# Patient Record
Sex: FEMALE | Race: WHITE
[De-identification: names, ages, dates, MRNs, and addresses within clinical notes are randomized per-mention and may not be internally consistent; named-entity substitution may affect disease eponyms.]

---

## 2018-01-18 ENCOUNTER — HOSPITAL ENCOUNTER (OUTPATIENT)
Age: 78
End: 2018-01-18
Payer: COMMERCIAL

## 2018-01-18 DIAGNOSIS — M19.042: ICD-10-CM

## 2018-01-18 DIAGNOSIS — M05.741: Primary | ICD-10-CM

## 2018-01-18 DIAGNOSIS — M19.041: ICD-10-CM

## 2018-01-18 DIAGNOSIS — D89.9: ICD-10-CM

## 2018-01-18 DIAGNOSIS — R74.8: ICD-10-CM

## 2018-01-18 DIAGNOSIS — D72.818: ICD-10-CM

## 2018-01-18 DIAGNOSIS — M05.742: ICD-10-CM

## 2018-01-18 DIAGNOSIS — Z51.81: ICD-10-CM

## 2018-01-18 LAB
ALBUMIN LEVEL: 3.7 GM/DL (ref 3.4–5)
ALBUMIN/GLOB SERPL: 1.2 {RATIO} (ref 1.1–1.8)
ALP ISO SERPL-ACNC: 68 U/L (ref 46–116)
ALT SERPLBLD-CCNC: 106 U/L (ref 12–78)
ANION GAP SERPL CALC-SCNC: 9.3 MEQ/L (ref 5–15)
AST SERPL QL: 87 U/L (ref 15–37)
BILIRUBIN,TOTAL: 1.2 MG/DL (ref 0.2–1)
BUN SERPL-MCNC: 16 MG/DL (ref 7–18)
CALCIUM SPEC-MCNC: 8.8 MG/DL (ref 8.5–10.1)
CHLORIDE SPEC-SCNC: 103 MMOL/L (ref 98–107)
CO2 SERPL-SCNC: 28 MMOL/L (ref 21–32)
CREAT BLD-SCNC: 0.69 MG/DL (ref 0.55–1.02)
CRP SERPL HS-MCNC: 0.5 MG/L (ref 0–0.9)
ESTIMATED GLOMERULAR FILT RATE: 83 ML/MIN (ref 60–?)
GFR (AFRICAN AMERICAN): 100 ML/MIN (ref 60–?)
GLOBULIN SER CALC-MCNC: 3 GM/DL (ref 1.3–3.2)
GLUCOSE: 150 MG/DL (ref 74–106)
HCT VFR BLD CALC: 38.2 % (ref 37–47)
HGB BLD-MCNC: 12.1 G/DL (ref 12.2–16.2)
MCHC RBC-ENTMCNC: 31.7 G/DL (ref 31.8–35.4)
MCV RBC: 108.1 FL (ref 81–99)
MEAN CORPUSCULAR HEMOGLOBIN: 34.3 PG (ref 27–31.2)
PLATELET # BLD: 133 K/MM3 (ref 142–424)
POTASSIUM: 4.3 MMOL/L (ref 3.5–5.1)
PROT SERPL-MCNC: 6.7 GM/DL (ref 6.4–8.2)
RBC # BLD AUTO: 3.53 M/MM3 (ref 4.2–5.4)
SODIUM SPEC-SCNC: 136 MMOL/L (ref 136–145)
WBC # BLD AUTO: 4.1 K/MM3 (ref 4.8–10.8)

## 2018-01-18 PROCEDURE — 80053 COMPREHEN METABOLIC PANEL: CPT

## 2018-01-18 PROCEDURE — 36415 COLL VENOUS BLD VENIPUNCTURE: CPT

## 2018-01-18 PROCEDURE — 85651 RBC SED RATE NONAUTOMATED: CPT

## 2018-01-18 PROCEDURE — 86140 C-REACTIVE PROTEIN: CPT

## 2018-01-18 PROCEDURE — 85025 COMPLETE CBC W/AUTO DIFF WBC: CPT

## 2018-01-30 ENCOUNTER — OFFICE VISIT (OUTPATIENT)
Dept: RETAIL CLINIC | Facility: CLINIC | Age: 78
End: 2018-01-30

## 2018-01-30 VITALS
WEIGHT: 158 LBS | BODY MASS INDEX: 26.33 KG/M2 | RESPIRATION RATE: 18 BRPM | HEART RATE: 114 BPM | TEMPERATURE: 99.8 F | OXYGEN SATURATION: 96 % | HEIGHT: 65 IN

## 2018-01-30 DIAGNOSIS — J10.1 INFLUENZA B: Primary | ICD-10-CM

## 2018-01-30 LAB
EXPIRATION DATE: NORMAL
FLUAV AG NPH QL: NORMAL
FLUBV AG NPH QL: NORMAL
INTERNAL CONTROL: NORMAL
Lab: NORMAL

## 2018-01-30 PROCEDURE — 99213 OFFICE O/P EST LOW 20 MIN: CPT | Performed by: NURSE PRACTITIONER

## 2018-01-30 PROCEDURE — 87804 INFLUENZA ASSAY W/OPTIC: CPT | Performed by: NURSE PRACTITIONER

## 2018-01-30 RX ORDER — OSELTAMIVIR PHOSPHATE 75 MG/1
75 CAPSULE ORAL
Qty: 10 CAPSULE | Refills: 0 | Status: SHIPPED | OUTPATIENT
Start: 2018-01-30 | End: 2018-02-04

## 2018-01-30 RX ORDER — ALBUTEROL SULFATE 90 UG/1
2 AEROSOL, METERED RESPIRATORY (INHALATION) EVERY 4 HOURS PRN
Qty: 1 INHALER | Refills: 0 | Status: SHIPPED | OUTPATIENT
Start: 2018-01-30

## 2018-02-02 ENCOUNTER — HOSPITAL ENCOUNTER (INPATIENT)
Dept: HOSPITAL 22 - ER | Age: 78
LOS: 3 days | Discharge: HOME | DRG: 194 | End: 2018-02-05
Payer: COMMERCIAL

## 2018-02-02 VITALS
OXYGEN SATURATION: 99 % | RESPIRATION RATE: 18 BRPM | SYSTOLIC BLOOD PRESSURE: 135 MMHG | DIASTOLIC BLOOD PRESSURE: 78 MMHG | HEART RATE: 120 BPM | TEMPERATURE: 98.06 F

## 2018-02-02 VITALS — BODY MASS INDEX: 24.9 KG/M2 | BODY MASS INDEX: 26.2 KG/M2

## 2018-02-02 VITALS
HEART RATE: 107 BPM | RESPIRATION RATE: 18 BRPM | TEMPERATURE: 98.2 F | DIASTOLIC BLOOD PRESSURE: 82 MMHG | OXYGEN SATURATION: 98 % | SYSTOLIC BLOOD PRESSURE: 154 MMHG

## 2018-02-02 VITALS — HEART RATE: 97 BPM

## 2018-02-02 VITALS
OXYGEN SATURATION: 90 % | DIASTOLIC BLOOD PRESSURE: 79 MMHG | TEMPERATURE: 98 F | SYSTOLIC BLOOD PRESSURE: 150 MMHG | HEART RATE: 111 BPM | RESPIRATION RATE: 18 BRPM

## 2018-02-02 VITALS — HEART RATE: 80 BPM

## 2018-02-02 VITALS
RESPIRATION RATE: 18 BRPM | TEMPERATURE: 98.78 F | OXYGEN SATURATION: 97 % | DIASTOLIC BLOOD PRESSURE: 91 MMHG | SYSTOLIC BLOOD PRESSURE: 187 MMHG | HEART RATE: 108 BPM

## 2018-02-02 VITALS — OXYGEN SATURATION: 90 %

## 2018-02-02 DIAGNOSIS — I10: ICD-10-CM

## 2018-02-02 DIAGNOSIS — Z83.49: ICD-10-CM

## 2018-02-02 DIAGNOSIS — Z82.3: ICD-10-CM

## 2018-02-02 DIAGNOSIS — Z83.3: ICD-10-CM

## 2018-02-02 DIAGNOSIS — J18.9: Primary | ICD-10-CM

## 2018-02-02 DIAGNOSIS — N39.0: ICD-10-CM

## 2018-02-02 DIAGNOSIS — Z79.899: ICD-10-CM

## 2018-02-02 DIAGNOSIS — D61.818: ICD-10-CM

## 2018-02-02 DIAGNOSIS — Z82.49: ICD-10-CM

## 2018-02-02 DIAGNOSIS — Z79.52: ICD-10-CM

## 2018-02-02 LAB
ALBUMIN LEVEL: 3.4 GM/DL (ref 3.4–5)
ALBUMIN/GLOB SERPL: 1 {RATIO} (ref 1.1–1.8)
ALP ISO SERPL-ACNC: 61 U/L (ref 46–116)
ALT SERPLBLD-CCNC: 43 U/L (ref 12–78)
ANION GAP SERPL CALC-SCNC: 14.1 MEQ/L (ref 5–15)
AST SERPL QL: 41 U/L (ref 15–37)
BACTERIA URNS QL MICRO: (no result) /LPF
BILIRUBIN,TOTAL: 0.9 MG/DL (ref 0.2–1)
BUN SERPL-MCNC: 16 MG/DL (ref 7–18)
CALCIUM SPEC-MCNC: 8.3 MG/DL (ref 8.5–10.1)
CHLORIDE SPEC-SCNC: 99 MMOL/L (ref 98–107)
CO2 SERPL-SCNC: 26 MMOL/L (ref 21–32)
COLOR UR: YELLOW
CREAT BLD-SCNC: 0.66 MG/DL (ref 0.55–1.02)
CREATININE CLEARANCE ESTIMATED: 50 ML/MIN (ref 0–300)
ESTIMATED GLOMERULAR FILT RATE: 87 ML/MIN (ref 60–?)
GFR (AFRICAN AMERICAN): 106 ML/MIN (ref 60–?)
GLOBULIN SER CALC-MCNC: 3.5 GM/DL (ref 1.3–3.2)
GLUCOSE: 101 MG/DL (ref 74–106)
HCT VFR BLD CALC: 36.7 % (ref 37–47)
HGB BLD-MCNC: 11.8 G/DL (ref 12.2–16.2)
KETONES UR STRIP.AUTO-MCNC: (no result) MG/DL
LEUKOCYTE ESTERASE UR QL STRIP: (no result)
MCHC RBC-ENTMCNC: 32 G/DL (ref 31.8–35.4)
MCV RBC: 105.2 FL (ref 81–99)
MEAN CORPUSCULAR HEMOGLOBIN: 33.7 PG (ref 27–31.2)
MICRO URNS: (no result)
PH UR: 6 [PH] (ref 5–8.5)
PLATELET # BLD: 91 K/MM3 (ref 142–424)
POTASSIUM: 4.1 MMOL/L (ref 3.5–5.1)
PROT SERPL-MCNC: 6.9 GM/DL (ref 6.4–8.2)
RBC # BLD AUTO: 3.49 M/MM3 (ref 4.2–5.4)
RBC #/AREA URNS HPF: (no result) #/HPF (ref 0–3)
SODIUM SPEC-SCNC: 135 MMOL/L (ref 136–145)
SP GR UR: 1.01 (ref 1–1.03)
SQUAMOUS URNS QL MICRO: (no result) #/HPF (ref 0–5)
TRANS CELLS URNS QL MICRO: (no result) #/LPF (ref 0–3)
UROBILINOGEN UR QL: 0.2 EU/DL
WBC # BLD AUTO: 2.3 K/MM3 (ref 4.8–10.8)
WBC # UR: (no result) #/HPF (ref 0–3)

## 2018-02-02 PROCEDURE — 94640 AIRWAY INHALATION TREATMENT: CPT

## 2018-02-02 PROCEDURE — 83605 ASSAY OF LACTIC ACID: CPT

## 2018-02-02 PROCEDURE — 85025 COMPLETE CBC W/AUTO DIFF WBC: CPT

## 2018-02-02 PROCEDURE — 71046 X-RAY EXAM CHEST 2 VIEWS: CPT

## 2018-02-02 PROCEDURE — 87040 BLOOD CULTURE FOR BACTERIA: CPT

## 2018-02-02 PROCEDURE — 85007 BL SMEAR W/DIFF WBC COUNT: CPT

## 2018-02-02 PROCEDURE — 99281 EMR DPT VST MAYX REQ PHY/QHP: CPT

## 2018-02-02 PROCEDURE — 87088 URINE BACTERIA CULTURE: CPT

## 2018-02-02 PROCEDURE — 81001 URINALYSIS AUTO W/SCOPE: CPT

## 2018-02-02 PROCEDURE — 87086 URINE CULTURE/COLONY COUNT: CPT

## 2018-02-02 PROCEDURE — 80053 COMPREHEN METABOLIC PANEL: CPT

## 2018-02-02 PROCEDURE — 36415 COLL VENOUS BLD VENIPUNCTURE: CPT

## 2018-02-02 PROCEDURE — 87186 SC STD MICRODIL/AGAR DIL: CPT

## 2018-02-02 NOTE — PC.NURSE
daughter refused tessalon perrle for patient. pt educated on medication. md comes to room also to educate pt and family. Family still refuses med as well as the pt refuses med.

## 2018-02-02 NOTE — PC.NURSE
THIS PATIENT PRESENTED TO THE FLOOR AT APPROXIMATELY 1600 HOURS FROM THE ER WITH A DIAGNOSIS OF PNEUMONIA.  SHE WAS DIAGNOSED WITH THE FLU LAST THURSDAY AND THERE HAS BEEN NO IMPROVEMENT.  SHE STATES SHE TOOK HER TAMIFLU AS ORDERED.  ADMISSION HAS

## 2018-02-03 VITALS
HEART RATE: 97 BPM | SYSTOLIC BLOOD PRESSURE: 117 MMHG | DIASTOLIC BLOOD PRESSURE: 65 MMHG | RESPIRATION RATE: 18 BRPM | TEMPERATURE: 98.4 F | OXYGEN SATURATION: 94 %

## 2018-02-03 VITALS
TEMPERATURE: 98.24 F | SYSTOLIC BLOOD PRESSURE: 124 MMHG | OXYGEN SATURATION: 99 % | HEART RATE: 106 BPM | DIASTOLIC BLOOD PRESSURE: 67 MMHG | RESPIRATION RATE: 18 BRPM

## 2018-02-03 VITALS
TEMPERATURE: 98.1 F | RESPIRATION RATE: 18 BRPM | HEART RATE: 110 BPM | OXYGEN SATURATION: 98 % | SYSTOLIC BLOOD PRESSURE: 171 MMHG | DIASTOLIC BLOOD PRESSURE: 81 MMHG

## 2018-02-03 VITALS
TEMPERATURE: 98 F | HEART RATE: 72 BPM | DIASTOLIC BLOOD PRESSURE: 65 MMHG | RESPIRATION RATE: 16 BRPM | OXYGEN SATURATION: 94 % | SYSTOLIC BLOOD PRESSURE: 127 MMHG

## 2018-02-03 VITALS
DIASTOLIC BLOOD PRESSURE: 69 MMHG | RESPIRATION RATE: 18 BRPM | TEMPERATURE: 98.24 F | SYSTOLIC BLOOD PRESSURE: 126 MMHG | OXYGEN SATURATION: 95 % | HEART RATE: 95 BPM

## 2018-02-03 VITALS — OXYGEN SATURATION: 94 % | RESPIRATION RATE: 16 BRPM | HEART RATE: 81 BPM

## 2018-02-03 VITALS — OXYGEN SATURATION: 98 % | HEART RATE: 89 BPM

## 2018-02-03 VITALS
DIASTOLIC BLOOD PRESSURE: 72 MMHG | SYSTOLIC BLOOD PRESSURE: 134 MMHG | TEMPERATURE: 98.96 F | RESPIRATION RATE: 18 BRPM | OXYGEN SATURATION: 94 % | HEART RATE: 99 BPM

## 2018-02-03 VITALS — HEART RATE: 88 BPM

## 2018-02-03 VITALS — HEART RATE: 72 BPM

## 2018-02-03 LAB
CELLS COUNTED: 50
HCT VFR BLD CALC: 32.1 % (ref 37–47)
HGB BLD-MCNC: 10.5 G/DL (ref 12.2–16.2)
MACROCYTES BLD QL: (no result)
MANUAL DIFFERENTIAL: (no result)
MCHC RBC-ENTMCNC: 32.6 G/DL (ref 31.8–35.4)
MCV RBC: 105.2 FL (ref 81–99)
MEAN CORPUSCULAR HEMOGLOBIN: 34.3 PG (ref 27–31.2)
PLATELET # BLD: 71 K/MM3 (ref 142–424)
RBC # BLD AUTO: 3.05 M/MM3 (ref 4.2–5.4)
WBC # BLD AUTO: 1.3 K/MM3 (ref 4.8–10.8)

## 2018-02-03 NOTE — PC.NURSE
PT ALERT AND ORIENTED. SLEPT MOST OF SHIFT. DAUGHTER AT BEDSIDE. BREATH SOUNDS EQUAL AND CLEAR, RESPIRATIONS EVEN AND UNLABORED. CONTINUES ON IV NS@50/HR AND IV ABX. NO C/O PAIN OR DISCOMFORT. OCCASIONAL COUGH HEARD. PT STABLE. WILL CONTINUE TO

## 2018-02-03 NOTE — PC.NURSE
Rhonchi noted right anterior and posterior lobes. O2 sats in 90's on room air. Pt has been up to the chair several times this shift and has ambulated around her room with assist from family at bedside. No complaints verbalized. Will continue to

## 2018-02-04 VITALS
SYSTOLIC BLOOD PRESSURE: 155 MMHG | TEMPERATURE: 98.2 F | HEART RATE: 102 BPM | OXYGEN SATURATION: 98 % | DIASTOLIC BLOOD PRESSURE: 70 MMHG | RESPIRATION RATE: 18 BRPM

## 2018-02-04 VITALS
TEMPERATURE: 97.88 F | OXYGEN SATURATION: 96 % | SYSTOLIC BLOOD PRESSURE: 119 MMHG | HEART RATE: 86 BPM | DIASTOLIC BLOOD PRESSURE: 66 MMHG | RESPIRATION RATE: 16 BRPM

## 2018-02-04 VITALS
HEART RATE: 91 BPM | RESPIRATION RATE: 16 BRPM | SYSTOLIC BLOOD PRESSURE: 148 MMHG | OXYGEN SATURATION: 95 % | TEMPERATURE: 98.3 F | DIASTOLIC BLOOD PRESSURE: 75 MMHG

## 2018-02-04 VITALS
TEMPERATURE: 98.24 F | OXYGEN SATURATION: 95 % | RESPIRATION RATE: 18 BRPM | HEART RATE: 94 BPM | DIASTOLIC BLOOD PRESSURE: 63 MMHG | SYSTOLIC BLOOD PRESSURE: 137 MMHG

## 2018-02-04 VITALS — HEART RATE: 84 BPM

## 2018-02-04 VITALS
DIASTOLIC BLOOD PRESSURE: 56 MMHG | TEMPERATURE: 98.1 F | OXYGEN SATURATION: 95 % | HEART RATE: 93 BPM | RESPIRATION RATE: 18 BRPM | SYSTOLIC BLOOD PRESSURE: 127 MMHG

## 2018-02-04 VITALS — HEART RATE: 72 BPM

## 2018-02-04 VITALS — HEART RATE: 94 BPM | OXYGEN SATURATION: 95 %

## 2018-02-04 VITALS — HEART RATE: 88 BPM

## 2018-02-04 VITALS — HEART RATE: 98 BPM

## 2018-02-04 LAB
HCT VFR BLD CALC: 32 % (ref 37–47)
HGB BLD-MCNC: 10.6 G/DL (ref 12.2–16.2)
MCHC RBC-ENTMCNC: 33.1 G/DL (ref 31.8–35.4)
MCV RBC: 105.7 FL (ref 81–99)
MEAN CORPUSCULAR HEMOGLOBIN: 35 PG (ref 27–31.2)
PLATELET # BLD: 58 K/MM3 (ref 142–424)
RBC # BLD AUTO: 3.03 M/MM3 (ref 4.2–5.4)
WBC # BLD AUTO: 2.1 K/MM3 (ref 4.8–10.8)

## 2018-02-04 NOTE — PC.NURSE
A&O x3. Rhonchi and wheezing noted to right lung fields. Pt remains on room air w/o2 sats running in the 90's. She has ambulated in the hallway tid with assist from daughter, no distress noted. Abd is soft and nontender, bowel sounds active. HR has

## 2018-02-04 NOTE — PC.NURSE
PT SLEPT MOST ALL OF SHIFT. C/O MILD NAUSEA AS WELL AS COUGH BEGINNING OF SHIFT. MED GIVEN AND NO FURTHER COMPLAINTS REPORTED. DAUGHTER AT BEDSIDE. RESPIRATIONS EVEN AND UNLABORED. BREATH SOUNDS SCATTERED RHONCHI. IV SECURE AND PATENT, INFUSING

## 2018-02-05 VITALS
OXYGEN SATURATION: 96 % | RESPIRATION RATE: 18 BRPM | DIASTOLIC BLOOD PRESSURE: 69 MMHG | TEMPERATURE: 98.06 F | SYSTOLIC BLOOD PRESSURE: 156 MMHG | HEART RATE: 95 BPM

## 2018-02-05 VITALS — OXYGEN SATURATION: 95 %

## 2018-02-05 VITALS
OXYGEN SATURATION: 97 % | HEART RATE: 96 BPM | RESPIRATION RATE: 18 BRPM | TEMPERATURE: 97.8 F | SYSTOLIC BLOOD PRESSURE: 146 MMHG | DIASTOLIC BLOOD PRESSURE: 79 MMHG

## 2018-02-05 VITALS
DIASTOLIC BLOOD PRESSURE: 79 MMHG | HEART RATE: 87 BPM | TEMPERATURE: 98.06 F | RESPIRATION RATE: 18 BRPM | SYSTOLIC BLOOD PRESSURE: 145 MMHG | OXYGEN SATURATION: 93 %

## 2018-02-05 VITALS — HEART RATE: 95 BPM

## 2018-02-05 VITALS — HEART RATE: 74 BPM

## 2018-02-05 NOTE — PC.NURSE
PT ALERT AND ORIENTED. SLEPT MOST ALL OF SHIFT. C/O COUGH BEGINNING OF SHIFT, PRN MED FOR COUGH GIVEN, NO FURTHER COMPLAINTS REPORTED. IV SALINE LOCKED, PATENT. PT STATES SHE IS SUPPOSED TO BE DC'D HOME TODAY. RESPIRATIONS EVEN AND UNLABORED. BREATH

## 2018-02-27 ENCOUNTER — HOSPITAL ENCOUNTER (OUTPATIENT)
Age: 78
End: 2018-02-27
Payer: COMMERCIAL

## 2018-02-27 DIAGNOSIS — M05.742: ICD-10-CM

## 2018-02-27 DIAGNOSIS — Z51.81: ICD-10-CM

## 2018-02-27 DIAGNOSIS — M19.042: ICD-10-CM

## 2018-02-27 DIAGNOSIS — D89.9: ICD-10-CM

## 2018-02-27 DIAGNOSIS — M19.041: ICD-10-CM

## 2018-02-27 DIAGNOSIS — D72.818: ICD-10-CM

## 2018-02-27 DIAGNOSIS — R74.8: ICD-10-CM

## 2018-02-27 DIAGNOSIS — M05.741: Primary | ICD-10-CM

## 2018-02-27 LAB
ALBUMIN LEVEL: 3.5 GM/DL (ref 3.4–5)
ALBUMIN/GLOB SERPL: 1 {RATIO} (ref 1.1–1.8)
ALP ISO SERPL-ACNC: 66 U/L (ref 46–116)
ALT SERPLBLD-CCNC: 26 U/L (ref 12–78)
ANION GAP SERPL CALC-SCNC: 10.4 MEQ/L (ref 5–15)
AST SERPL QL: 19 U/L (ref 15–37)
BILIRUBIN,TOTAL: 0.8 MG/DL (ref 0.2–1)
BUN SERPL-MCNC: 9 MG/DL (ref 7–18)
CALCIUM SPEC-MCNC: 8.8 MG/DL (ref 8.5–10.1)
CHLORIDE SPEC-SCNC: 108 MMOL/L (ref 98–107)
CO2 SERPL-SCNC: 29 MMOL/L (ref 21–32)
CREAT BLD-SCNC: 0.56 MG/DL (ref 0.55–1.02)
CRP SERPL HS-MCNC: 0.3 MG/L (ref 0–0.9)
ESTIMATED GLOMERULAR FILT RATE: 106 ML/MIN (ref 60–?)
GFR (AFRICAN AMERICAN): 128 ML/MIN (ref 60–?)
GLOBULIN SER CALC-MCNC: 3.4 GM/DL (ref 1.3–3.2)
GLUCOSE: 103 MG/DL (ref 74–106)
HCT VFR BLD CALC: 37.2 % (ref 37–47)
HGB BLD-MCNC: 11.4 G/DL (ref 12.2–16.2)
MCHC RBC-ENTMCNC: 30.7 G/DL (ref 31.8–35.4)
MCV RBC: 112.3 FL (ref 81–99)
MEAN CORPUSCULAR HEMOGLOBIN: 34.5 PG (ref 27–31.2)
PLATELET # BLD: 124 K/MM3 (ref 142–424)
POTASSIUM: 4.4 MMOL/L (ref 3.5–5.1)
PROT SERPL-MCNC: 6.9 GM/DL (ref 6.4–8.2)
RBC # BLD AUTO: 3.31 M/MM3 (ref 4.2–5.4)
SODIUM SPEC-SCNC: 143 MMOL/L (ref 136–145)
WBC # BLD AUTO: 2.9 K/MM3 (ref 4.8–10.8)

## 2018-02-27 PROCEDURE — 85651 RBC SED RATE NONAUTOMATED: CPT

## 2018-02-27 PROCEDURE — 86140 C-REACTIVE PROTEIN: CPT

## 2018-02-27 PROCEDURE — 80053 COMPREHEN METABOLIC PANEL: CPT

## 2018-02-27 PROCEDURE — 85025 COMPLETE CBC W/AUTO DIFF WBC: CPT

## 2018-02-27 PROCEDURE — 36415 COLL VENOUS BLD VENIPUNCTURE: CPT

## 2018-04-13 ENCOUNTER — HOSPITAL ENCOUNTER (OUTPATIENT)
Age: 78
End: 2018-04-13
Payer: COMMERCIAL

## 2018-04-13 DIAGNOSIS — M05.741: Primary | ICD-10-CM

## 2018-04-13 DIAGNOSIS — M19.041: ICD-10-CM

## 2018-04-13 DIAGNOSIS — M19.042: ICD-10-CM

## 2018-04-13 DIAGNOSIS — D89.9: ICD-10-CM

## 2018-04-13 DIAGNOSIS — D72.818: ICD-10-CM

## 2018-04-13 DIAGNOSIS — M05.742: ICD-10-CM

## 2018-04-13 DIAGNOSIS — Z51.81: ICD-10-CM

## 2018-04-13 DIAGNOSIS — R74.8: ICD-10-CM

## 2018-04-13 LAB
ALBUMIN LEVEL: 3.6 GM/DL (ref 3.4–5)
ALBUMIN/GLOB SERPL: 1.1 {RATIO} (ref 1.1–1.8)
ALP ISO SERPL-ACNC: 66 U/L (ref 46–116)
ALT SERPLBLD-CCNC: 26 U/L (ref 12–78)
ANION GAP SERPL CALC-SCNC: 12.5 MEQ/L (ref 5–15)
AST SERPL QL: 22 U/L (ref 15–37)
BILIRUBIN,TOTAL: 0.5 MG/DL (ref 0.2–1)
BUN SERPL-MCNC: 16 MG/DL (ref 7–18)
CALCIUM SPEC-MCNC: 9.5 MG/DL (ref 8.5–10.1)
CHLORIDE SPEC-SCNC: 106 MMOL/L (ref 98–107)
CO2 SERPL-SCNC: 28 MMOL/L (ref 21–32)
CREAT BLD-SCNC: 0.91 MG/DL (ref 0.55–1.02)
CRP SERPL HS-MCNC: 0.2 MG/L (ref 0–0.9)
ESTIMATED GLOMERULAR FILT RATE: 60 ML/MIN (ref 60–?)
GFR (AFRICAN AMERICAN): 73 ML/MIN (ref 60–?)
GLOBULIN SER CALC-MCNC: 3.2 GM/DL (ref 1.3–3.2)
GLUCOSE: 192 MG/DL (ref 74–106)
HCT VFR BLD CALC: 33.7 % (ref 37–47)
HGB BLD-MCNC: 10.8 G/DL (ref 12.2–16.2)
MCHC RBC-ENTMCNC: 31.9 G/DL (ref 31.8–35.4)
MCV RBC: 106.9 FL (ref 81–99)
MEAN CORPUSCULAR HEMOGLOBIN: 34.1 PG (ref 27–31.2)
PLATELET # BLD: 134 K/MM3 (ref 142–424)
POTASSIUM: 3.5 MMOL/L (ref 3.5–5.1)
PROT SERPL-MCNC: 6.8 GM/DL (ref 6.4–8.2)
RBC # BLD AUTO: 3.16 M/MM3 (ref 4.2–5.4)
SODIUM SPEC-SCNC: 143 MMOL/L (ref 136–145)
WBC # BLD AUTO: 3.2 K/MM3 (ref 4.8–10.8)

## 2018-04-13 PROCEDURE — 85025 COMPLETE CBC W/AUTO DIFF WBC: CPT

## 2018-04-13 PROCEDURE — 36415 COLL VENOUS BLD VENIPUNCTURE: CPT

## 2018-04-13 PROCEDURE — 86140 C-REACTIVE PROTEIN: CPT

## 2018-04-13 PROCEDURE — 85651 RBC SED RATE NONAUTOMATED: CPT

## 2018-04-13 PROCEDURE — 80053 COMPREHEN METABOLIC PANEL: CPT

## 2018-06-01 ENCOUNTER — HOSPITAL ENCOUNTER (OUTPATIENT)
Age: 78
End: 2018-06-01
Payer: COMMERCIAL

## 2018-06-01 DIAGNOSIS — M05.741: Primary | ICD-10-CM

## 2018-06-01 DIAGNOSIS — M05.742: ICD-10-CM

## 2018-06-01 DIAGNOSIS — Z51.81: ICD-10-CM

## 2018-06-01 DIAGNOSIS — M19.042: ICD-10-CM

## 2018-06-01 DIAGNOSIS — M19.041: ICD-10-CM

## 2018-06-01 DIAGNOSIS — D72.818: ICD-10-CM

## 2018-06-01 DIAGNOSIS — D89.9: ICD-10-CM

## 2018-06-01 LAB
ALBUMIN LEVEL: 3.4 GM/DL (ref 3.4–5)
ALBUMIN/GLOB SERPL: 1.1 {RATIO} (ref 1.1–1.8)
ALP ISO SERPL-ACNC: 60 U/L (ref 46–116)
ALT SERPLBLD-CCNC: 31 U/L (ref 12–78)
ANION GAP SERPL CALC-SCNC: 10 MEQ/L (ref 5–15)
AST SERPL QL: 26 U/L (ref 15–37)
BILIRUBIN,TOTAL: 0.5 MG/DL (ref 0.2–1)
BUN SERPL-MCNC: 22 MG/DL (ref 7–18)
CALCIUM SPEC-MCNC: 9 MG/DL (ref 8.5–10.1)
CHLORIDE SPEC-SCNC: 105 MMOL/L (ref 98–107)
CO2 SERPL-SCNC: 32 MMOL/L (ref 21–32)
CREAT BLD-SCNC: 0.84 MG/DL (ref 0.55–1.02)
CRP SERPL HS-MCNC: 0.4 MG/L (ref 0–0.9)
ESTIMATED GLOMERULAR FILT RATE: 66 ML/MIN (ref 60–?)
GFR (AFRICAN AMERICAN): 80 ML/MIN (ref 60–?)
GLOBULIN SER CALC-MCNC: 3.1 GM/DL (ref 1.3–3.2)
GLUCOSE: 96 MG/DL (ref 74–106)
HCT VFR BLD CALC: 34.6 % (ref 37–47)
HGB BLD-MCNC: 10.6 G/DL (ref 12.2–16.2)
MCHC RBC-ENTMCNC: 30.5 G/DL (ref 31.8–35.4)
MCV RBC: 107.5 FL (ref 81–99)
MEAN CORPUSCULAR HEMOGLOBIN: 32.8 PG (ref 27–31.2)
PLATELET # BLD: 159 K/MM3 (ref 142–424)
POTASSIUM: 4 MMOL/L (ref 3.5–5.1)
PROT SERPL-MCNC: 6.5 GM/DL (ref 6.4–8.2)
RBC # BLD AUTO: 3.22 M/MM3 (ref 4.2–5.4)
SODIUM SPEC-SCNC: 143 MMOL/L (ref 136–145)
WBC # BLD AUTO: 4.4 K/MM3 (ref 4.8–10.8)

## 2018-06-01 PROCEDURE — 80053 COMPREHEN METABOLIC PANEL: CPT

## 2018-06-01 PROCEDURE — 85651 RBC SED RATE NONAUTOMATED: CPT

## 2018-06-01 PROCEDURE — 36415 COLL VENOUS BLD VENIPUNCTURE: CPT

## 2018-06-01 PROCEDURE — 86140 C-REACTIVE PROTEIN: CPT

## 2018-06-01 PROCEDURE — 85025 COMPLETE CBC W/AUTO DIFF WBC: CPT

## 2018-07-13 ENCOUNTER — HOSPITAL ENCOUNTER (OUTPATIENT)
Age: 78
End: 2018-07-13
Payer: COMMERCIAL

## 2018-07-13 DIAGNOSIS — M15.0: ICD-10-CM

## 2018-07-13 DIAGNOSIS — M05.742: ICD-10-CM

## 2018-07-13 DIAGNOSIS — Z79.899: ICD-10-CM

## 2018-07-13 DIAGNOSIS — M19.042: ICD-10-CM

## 2018-07-13 DIAGNOSIS — M19.041: ICD-10-CM

## 2018-07-13 DIAGNOSIS — Z51.81: ICD-10-CM

## 2018-07-13 DIAGNOSIS — D89.9: ICD-10-CM

## 2018-07-13 DIAGNOSIS — M05.841: ICD-10-CM

## 2018-07-13 DIAGNOSIS — M05.741: Primary | ICD-10-CM

## 2018-07-13 DIAGNOSIS — M05.842: ICD-10-CM

## 2018-07-13 DIAGNOSIS — D72.818: ICD-10-CM

## 2018-07-13 LAB
ALBUMIN LEVEL: 3.5 GM/DL (ref 3.4–5)
ALBUMIN/GLOB SERPL: 1.1 {RATIO} (ref 1.1–1.8)
ALP ISO SERPL-ACNC: 61 U/L (ref 46–116)
ALT SERPLBLD-CCNC: 28 U/L (ref 12–78)
ANION GAP SERPL CALC-SCNC: 8.8 MEQ/L (ref 5–15)
AST SERPL QL: 26 U/L (ref 15–37)
BILIRUBIN,TOTAL: 0.5 MG/DL (ref 0.2–1)
BUN SERPL-MCNC: 22 MG/DL (ref 7–18)
CALCIUM SPEC-MCNC: 9.2 MG/DL (ref 8.5–10.1)
CHLORIDE SPEC-SCNC: 109 MMOL/L (ref 98–107)
CO2 SERPL-SCNC: 31 MMOL/L (ref 21–32)
CREAT BLD-SCNC: 0.88 MG/DL (ref 0.55–1.02)
CRP SERPL HS-MCNC: < 0.2 MG/L (ref 0–0.9)
ESTIMATED GLOMERULAR FILT RATE: 63 ML/MIN (ref 60–?)
GFR (AFRICAN AMERICAN): 76 ML/MIN (ref 60–?)
GLOBULIN SER CALC-MCNC: 3.3 GM/DL (ref 1.3–3.2)
GLUCOSE: 109 MG/DL (ref 74–106)
HCT VFR BLD CALC: 37.1 % (ref 37–47)
HGB BLD-MCNC: 11.6 G/DL (ref 12.2–16.2)
MCHC RBC-ENTMCNC: 31.2 G/DL (ref 31.8–35.4)
MCV RBC: 106.4 FL (ref 81–99)
MEAN CORPUSCULAR HEMOGLOBIN: 33.2 PG (ref 27–31.2)
PLATELET # BLD: 160 K/MM3 (ref 142–424)
POTASSIUM: 4.8 MMOL/L (ref 3.5–5.1)
PROT SERPL-MCNC: 6.8 GM/DL (ref 6.4–8.2)
RBC # BLD AUTO: 3.49 M/MM3 (ref 4.2–5.4)
SODIUM SPEC-SCNC: 144 MMOL/L (ref 136–145)
WBC # BLD AUTO: 3.5 K/MM3 (ref 4.8–10.8)

## 2018-07-13 PROCEDURE — 85651 RBC SED RATE NONAUTOMATED: CPT

## 2018-07-13 PROCEDURE — 86140 C-REACTIVE PROTEIN: CPT

## 2018-07-13 PROCEDURE — 36415 COLL VENOUS BLD VENIPUNCTURE: CPT

## 2018-07-13 PROCEDURE — 85025 COMPLETE CBC W/AUTO DIFF WBC: CPT

## 2018-07-13 PROCEDURE — 80053 COMPREHEN METABOLIC PANEL: CPT

## 2018-08-25 ENCOUNTER — HOSPITAL ENCOUNTER (OUTPATIENT)
Age: 78
End: 2018-08-25
Payer: COMMERCIAL

## 2018-08-25 DIAGNOSIS — M05.741: Primary | ICD-10-CM

## 2018-08-25 DIAGNOSIS — M05.742: ICD-10-CM

## 2018-08-25 DIAGNOSIS — D72.818: ICD-10-CM

## 2018-08-25 DIAGNOSIS — D89.9: ICD-10-CM

## 2018-08-25 DIAGNOSIS — M19.041: ICD-10-CM

## 2018-08-25 DIAGNOSIS — Z51.81: ICD-10-CM

## 2018-08-25 DIAGNOSIS — M19.042: ICD-10-CM

## 2018-08-25 LAB
ALBUMIN LEVEL: 3.4 GM/DL (ref 3.4–5)
ALBUMIN/GLOB SERPL: 1.1 {RATIO} (ref 1.1–1.8)
ALP ISO SERPL-ACNC: 60 U/L (ref 46–116)
ALT SERPLBLD-CCNC: 24 U/L (ref 12–78)
ANION GAP SERPL CALC-SCNC: 12.1 MEQ/L (ref 5–15)
AST SERPL QL: 21 U/L (ref 15–37)
BILIRUBIN,TOTAL: 0.4 MG/DL (ref 0.2–1)
BUN SERPL-MCNC: 12 MG/DL (ref 7–18)
CALCIUM SPEC-MCNC: 8.8 MG/DL (ref 8.5–10.1)
CHLORIDE SPEC-SCNC: 107 MMOL/L (ref 98–107)
CO2 SERPL-SCNC: 29 MMOL/L (ref 21–32)
CREAT BLD-SCNC: 0.88 MG/DL (ref 0.55–1.02)
CRP SERPL HS-MCNC: < 0.2 MG/L (ref 0–0.9)
ESTIMATED GLOMERULAR FILT RATE: 63 ML/MIN (ref 60–?)
GFR (AFRICAN AMERICAN): 76 ML/MIN (ref 60–?)
GLOBULIN SER CALC-MCNC: 3 GM/DL (ref 1.3–3.2)
GLUCOSE: 122 MG/DL (ref 74–106)
HCT VFR BLD CALC: 33.4 % (ref 37–47)
HGB BLD-MCNC: 10.7 G/DL (ref 12.2–16.2)
MCHC RBC-ENTMCNC: 31.9 G/DL (ref 31.8–35.4)
MCV RBC: 107 FL (ref 81–99)
MEAN CORPUSCULAR HEMOGLOBIN: 34.2 PG (ref 27–31.2)
PLATELET # BLD: 149 K/MM3 (ref 142–424)
POTASSIUM: 4.1 MMOL/L (ref 3.5–5.1)
PROT SERPL-MCNC: 6.4 GM/DL (ref 6.4–8.2)
RBC # BLD AUTO: 3.12 M/MM3 (ref 4.2–5.4)
SODIUM SPEC-SCNC: 144 MMOL/L (ref 136–145)
WBC # BLD AUTO: 3.3 K/MM3 (ref 4.8–10.8)

## 2018-08-25 PROCEDURE — 86140 C-REACTIVE PROTEIN: CPT

## 2018-08-25 PROCEDURE — 85025 COMPLETE CBC W/AUTO DIFF WBC: CPT

## 2018-08-25 PROCEDURE — 36415 COLL VENOUS BLD VENIPUNCTURE: CPT

## 2018-08-25 PROCEDURE — 85651 RBC SED RATE NONAUTOMATED: CPT

## 2018-08-25 PROCEDURE — 80053 COMPREHEN METABOLIC PANEL: CPT

## 2018-10-17 ENCOUNTER — HOSPITAL ENCOUNTER (OUTPATIENT)
Age: 78
End: 2018-10-17
Payer: COMMERCIAL

## 2018-10-17 DIAGNOSIS — M19.041: ICD-10-CM

## 2018-10-17 DIAGNOSIS — D72.818: ICD-10-CM

## 2018-10-17 DIAGNOSIS — Z51.81: ICD-10-CM

## 2018-10-17 DIAGNOSIS — M19.042: ICD-10-CM

## 2018-10-17 DIAGNOSIS — M05.742: ICD-10-CM

## 2018-10-17 DIAGNOSIS — M05.741: Primary | ICD-10-CM

## 2018-10-17 DIAGNOSIS — D89.9: ICD-10-CM

## 2018-10-17 DIAGNOSIS — R74.8: ICD-10-CM

## 2018-10-17 LAB
ALBUMIN LEVEL: 3.4 GM/DL (ref 3.4–5)
ALBUMIN/GLOB SERPL: 1.1 {RATIO} (ref 1.1–1.8)
ALP ISO SERPL-ACNC: 64 U/L (ref 46–116)
ALT SERPLBLD-CCNC: 24 U/L (ref 12–78)
ANION GAP SERPL CALC-SCNC: 7.9 MEQ/L (ref 5–15)
AST SERPL QL: 29 U/L (ref 15–37)
BILIRUBIN,TOTAL: 0.5 MG/DL (ref 0.2–1)
BUN SERPL-MCNC: 19 MG/DL (ref 7–18)
CALCIUM SPEC-MCNC: 9.1 MG/DL (ref 8.5–10.1)
CHLORIDE SPEC-SCNC: 105 MMOL/L (ref 98–107)
CHOLEST SPEC-SCNC: 190 MG/DL (ref 140–200)
CO2 SERPL-SCNC: 31 MMOL/L (ref 21–32)
CREAT BLD-SCNC: 0.89 MG/DL (ref 0.55–1.02)
CRP SERPL HS-MCNC: < 0.2 MG/L (ref 0–0.9)
ESTIMATED GLOMERULAR FILT RATE: 62 ML/MIN (ref 60–?)
GFR (AFRICAN AMERICAN): 75 ML/MIN (ref 60–?)
GLOBULIN SER CALC-MCNC: 3.2 GM/DL (ref 1.3–3.2)
GLUCOSE: 109 MG/DL (ref 74–106)
HCT VFR BLD CALC: 35 % (ref 37–47)
HDLC SERPL-MCNC: 74 MG/DL (ref 29–89)
HGB BLD-MCNC: 11 G/DL (ref 12.2–16.2)
MCHC RBC-ENTMCNC: 31.3 G/DL (ref 31.8–35.4)
MCV RBC: 104.5 FL (ref 81–99)
MEAN CORPUSCULAR HEMOGLOBIN: 32.7 PG (ref 27–31.2)
PLATELET # BLD: 127 K/MM3 (ref 142–424)
POTASSIUM: 3.9 MMOL/L (ref 3.5–5.1)
PROT SERPL-MCNC: 6.6 GM/DL (ref 6.4–8.2)
RBC # BLD AUTO: 3.35 M/MM3 (ref 4.2–5.4)
SODIUM SPEC-SCNC: 140 MMOL/L (ref 136–145)
T4 FREE SERPL-MCNC: 1.03 NG/DL (ref 0.76–1.46)
TRIGLYCERIDES: 61 MG/DL (ref 30–200)
TSH SERPL-ACNC: 0.86 UIU/ML (ref 0.36–3.74)
WBC # BLD AUTO: 3.7 K/MM3 (ref 4.8–10.8)

## 2018-10-17 PROCEDURE — 80053 COMPREHEN METABOLIC PANEL: CPT

## 2018-10-17 PROCEDURE — 85651 RBC SED RATE NONAUTOMATED: CPT

## 2018-10-17 PROCEDURE — 85025 COMPLETE CBC W/AUTO DIFF WBC: CPT

## 2018-10-17 PROCEDURE — 36415 COLL VENOUS BLD VENIPUNCTURE: CPT

## 2018-10-17 PROCEDURE — 86140 C-REACTIVE PROTEIN: CPT

## 2018-10-17 PROCEDURE — 82652 VIT D 1 25-DIHYDROXY: CPT

## 2018-10-17 PROCEDURE — 80061 LIPID PANEL: CPT

## 2018-10-17 PROCEDURE — 84481 FREE ASSAY (FT-3): CPT

## 2018-10-17 PROCEDURE — 84439 ASSAY OF FREE THYROXINE: CPT

## 2018-10-17 PROCEDURE — 84443 ASSAY THYROID STIM HORMONE: CPT

## 2018-10-19 LAB — T3FREE SERPL-MCNC: 2.9 PG/ML (ref 2–4.4)

## 2018-12-05 ENCOUNTER — HOSPITAL ENCOUNTER (OUTPATIENT)
Age: 78
End: 2018-12-05
Payer: COMMERCIAL

## 2018-12-05 DIAGNOSIS — R74.8: ICD-10-CM

## 2018-12-05 DIAGNOSIS — Z51.81: ICD-10-CM

## 2018-12-05 DIAGNOSIS — M19.042: ICD-10-CM

## 2018-12-05 DIAGNOSIS — D72.818: ICD-10-CM

## 2018-12-05 DIAGNOSIS — M05.742: ICD-10-CM

## 2018-12-05 DIAGNOSIS — M19.041: ICD-10-CM

## 2018-12-05 DIAGNOSIS — M05.741: Primary | ICD-10-CM

## 2018-12-05 DIAGNOSIS — D89.9: ICD-10-CM

## 2018-12-05 LAB
ALBUMIN LEVEL: 3.4 GM/DL (ref 3.4–5)
ALBUMIN/GLOB SERPL: 1.1 {RATIO} (ref 1.1–1.8)
ALP ISO SERPL-ACNC: 64 U/L (ref 46–116)
ALT SERPLBLD-CCNC: 23 U/L (ref 12–78)
ANION GAP SERPL CALC-SCNC: 13.2 MEQ/L (ref 5–15)
AST SERPL QL: 20 U/L (ref 15–37)
BILIRUBIN,TOTAL: 0.8 MG/DL (ref 0.2–1)
BUN SERPL-MCNC: 22 MG/DL (ref 7–18)
CALCIUM SPEC-MCNC: 8.6 MG/DL (ref 8.5–10.1)
CHLORIDE SPEC-SCNC: 103 MMOL/L (ref 98–107)
CO2 SERPL-SCNC: 28 MMOL/L (ref 21–32)
CREAT BLD-SCNC: 0.98 MG/DL (ref 0.55–1.02)
CRP SERPL HS-MCNC: < 0.2 MG/L (ref 0–0.9)
ESTIMATED GLOMERULAR FILT RATE: 55 ML/MIN (ref 60–?)
GFR (AFRICAN AMERICAN): 67 ML/MIN (ref 60–?)
GLOBULIN SER CALC-MCNC: 3.2 GM/DL (ref 1.3–3.2)
GLUCOSE: 164 MG/DL (ref 74–106)
HCT VFR BLD CALC: 34.7 % (ref 37–47)
HGB BLD-MCNC: 11.1 G/DL (ref 12.2–16.2)
MCHC RBC-ENTMCNC: 32 G/DL (ref 31.8–35.4)
MCV RBC: 104.1 FL (ref 81–99)
MEAN CORPUSCULAR HEMOGLOBIN: 33.3 PG (ref 27–31.2)
PLATELET # BLD: 146 K/MM3 (ref 142–424)
POTASSIUM: 4.2 MMOL/L (ref 3.5–5.1)
PROT SERPL-MCNC: 6.6 GM/DL (ref 6.4–8.2)
RBC # BLD AUTO: 3.33 M/MM3 (ref 4.2–5.4)
SODIUM SPEC-SCNC: 140 MMOL/L (ref 136–145)
WBC # BLD AUTO: 3.2 K/MM3 (ref 4.8–10.8)

## 2018-12-05 PROCEDURE — 85025 COMPLETE CBC W/AUTO DIFF WBC: CPT

## 2018-12-05 PROCEDURE — 80053 COMPREHEN METABOLIC PANEL: CPT

## 2018-12-05 PROCEDURE — 85651 RBC SED RATE NONAUTOMATED: CPT

## 2018-12-05 PROCEDURE — 86140 C-REACTIVE PROTEIN: CPT

## 2018-12-05 PROCEDURE — 36415 COLL VENOUS BLD VENIPUNCTURE: CPT

## 2019-03-05 ENCOUNTER — HOSPITAL ENCOUNTER (OUTPATIENT)
Age: 79
End: 2019-03-05
Payer: COMMERCIAL

## 2019-03-05 DIAGNOSIS — D89.9: ICD-10-CM

## 2019-03-05 DIAGNOSIS — Z51.81: ICD-10-CM

## 2019-03-05 DIAGNOSIS — M05.741: Primary | ICD-10-CM

## 2019-03-05 DIAGNOSIS — M15.0: ICD-10-CM

## 2019-03-05 LAB
ALBUMIN LEVEL: 3.7 GM/DL (ref 3.4–5)
ALBUMIN/GLOB SERPL: 1.1 {RATIO} (ref 1.1–1.8)
ALP ISO SERPL-ACNC: 72 U/L (ref 46–116)
ALT SERPLBLD-CCNC: 24 U/L (ref 12–78)
ANION GAP SERPL CALC-SCNC: 10.7 MEQ/L (ref 5–15)
AST SERPL QL: 18 U/L (ref 15–37)
BILIRUBIN,TOTAL: 0.5 MG/DL (ref 0.2–1)
BUN SERPL-MCNC: 20 MG/DL (ref 7–18)
CALCIUM SPEC-MCNC: 9.3 MG/DL (ref 8.5–10.1)
CHLORIDE SPEC-SCNC: 104 MMOL/L (ref 98–107)
CO2 SERPL-SCNC: 31 MMOL/L (ref 21–32)
CREAT BLD-SCNC: 0.98 MG/DL (ref 0.55–1.02)
CRP SERPL HS-MCNC: < 0.2 MG/L (ref 0–0.9)
ESTIMATED GLOMERULAR FILT RATE: 55 ML/MIN (ref 60–?)
GFR (AFRICAN AMERICAN): 67 ML/MIN (ref 60–?)
GLOBULIN SER CALC-MCNC: 3.4 GM/DL (ref 1.3–3.2)
GLUCOSE: 89 MG/DL (ref 74–106)
HCT VFR BLD CALC: 35 % (ref 37–47)
HGB BLD-MCNC: 11.2 G/DL (ref 12.2–16.2)
MCHC RBC-ENTMCNC: 32 G/DL (ref 31.8–35.4)
MCV RBC: 102.2 FL (ref 81–99)
MEAN CORPUSCULAR HEMOGLOBIN: 32.7 PG (ref 27–31.2)
PLATELET # BLD: 112 K/MM3 (ref 142–424)
POTASSIUM: 3.7 MMOL/L (ref 3.5–5.1)
PROT SERPL-MCNC: 7.1 GM/DL (ref 6.4–8.2)
RBC # BLD AUTO: 3.42 M/MM3 (ref 4.2–5.4)
SODIUM SPEC-SCNC: 142 MMOL/L (ref 136–145)
WBC # BLD AUTO: 3.6 K/MM3 (ref 4.8–10.8)

## 2019-03-05 PROCEDURE — 80053 COMPREHEN METABOLIC PANEL: CPT

## 2019-03-05 PROCEDURE — 85651 RBC SED RATE NONAUTOMATED: CPT

## 2019-03-05 PROCEDURE — 85025 COMPLETE CBC W/AUTO DIFF WBC: CPT

## 2019-03-05 PROCEDURE — 36415 COLL VENOUS BLD VENIPUNCTURE: CPT

## 2019-03-05 PROCEDURE — 86140 C-REACTIVE PROTEIN: CPT

## 2019-04-25 ENCOUNTER — HOSPITAL ENCOUNTER (OUTPATIENT)
Age: 79
End: 2019-04-25
Payer: COMMERCIAL

## 2019-04-25 DIAGNOSIS — M19.041: ICD-10-CM

## 2019-04-25 DIAGNOSIS — D72.818: ICD-10-CM

## 2019-04-25 DIAGNOSIS — M05.741: Primary | ICD-10-CM

## 2019-04-25 DIAGNOSIS — M05.742: ICD-10-CM

## 2019-04-25 DIAGNOSIS — M19.042: ICD-10-CM

## 2019-04-25 DIAGNOSIS — Z51.81: ICD-10-CM

## 2019-04-25 DIAGNOSIS — R74.8: ICD-10-CM

## 2019-04-25 DIAGNOSIS — D89.9: ICD-10-CM

## 2019-04-25 LAB
ALBUMIN LEVEL: 3.7 GM/DL (ref 3.4–5)
ALBUMIN/GLOB SERPL: 1.1 {RATIO} (ref 1.1–1.8)
ALP ISO SERPL-ACNC: 58 U/L (ref 46–116)
ALT SERPLBLD-CCNC: 27 U/L (ref 12–78)
ANION GAP SERPL CALC-SCNC: 13.4 MEQ/L (ref 5–15)
AST SERPL QL: 22 U/L (ref 15–37)
BILIRUBIN,TOTAL: 0.8 MG/DL (ref 0.2–1)
BUN SERPL-MCNC: 25 MG/DL (ref 7–18)
CALCIUM SPEC-MCNC: 9.3 MG/DL (ref 8.5–10.1)
CHLORIDE SPEC-SCNC: 103 MMOL/L (ref 98–107)
CO2 SERPL-SCNC: 28 MMOL/L (ref 21–32)
CREAT BLD-SCNC: 0.96 MG/DL (ref 0.55–1.02)
CRP SERPL HS-MCNC: 0.3 MG/L (ref 0–0.9)
ESTIMATED GLOMERULAR FILT RATE: 57 ML/MIN (ref 60–?)
GFR (AFRICAN AMERICAN): 68 ML/MIN (ref 60–?)
GLOBULIN SER CALC-MCNC: 3.3 GM/DL (ref 1.3–3.2)
GLUCOSE: 111 MG/DL (ref 74–106)
HCT VFR BLD CALC: 34.3 % (ref 37–47)
HGB BLD-MCNC: 11.1 G/DL (ref 12.2–16.2)
MCHC RBC-ENTMCNC: 32.4 G/DL (ref 31.8–35.4)
MCV RBC: 101.6 FL (ref 81–99)
MEAN CORPUSCULAR HEMOGLOBIN: 33 PG (ref 27–31.2)
PLATELET # BLD: 153 K/MM3 (ref 142–424)
POTASSIUM: 4.4 MMOL/L (ref 3.5–5.1)
PROT SERPL-MCNC: 7 GM/DL (ref 6.4–8.2)
RBC # BLD AUTO: 3.37 M/MM3 (ref 4.2–5.4)
SODIUM SPEC-SCNC: 140 MMOL/L (ref 136–145)
WBC # BLD AUTO: 3.9 K/MM3 (ref 4.8–10.8)

## 2019-04-25 PROCEDURE — 86140 C-REACTIVE PROTEIN: CPT

## 2019-04-25 PROCEDURE — 85651 RBC SED RATE NONAUTOMATED: CPT

## 2019-04-25 PROCEDURE — 36415 COLL VENOUS BLD VENIPUNCTURE: CPT

## 2019-04-25 PROCEDURE — 80053 COMPREHEN METABOLIC PANEL: CPT

## 2019-04-25 PROCEDURE — 85025 COMPLETE CBC W/AUTO DIFF WBC: CPT

## 2019-06-13 ENCOUNTER — HOSPITAL ENCOUNTER (OUTPATIENT)
Age: 79
End: 2019-06-13
Payer: COMMERCIAL

## 2019-06-13 DIAGNOSIS — M05.742: ICD-10-CM

## 2019-06-13 DIAGNOSIS — D72.818: ICD-10-CM

## 2019-06-13 DIAGNOSIS — M05.741: Primary | ICD-10-CM

## 2019-06-13 DIAGNOSIS — M19.042: ICD-10-CM

## 2019-06-13 DIAGNOSIS — D89.9: ICD-10-CM

## 2019-06-13 DIAGNOSIS — M19.041: ICD-10-CM

## 2019-06-13 DIAGNOSIS — R74.8: ICD-10-CM

## 2019-06-13 DIAGNOSIS — Z51.81: ICD-10-CM

## 2019-06-13 LAB
ALBUMIN LEVEL: 3.6 GM/DL (ref 3.4–5)
ALBUMIN/GLOB SERPL: 1.2 {RATIO} (ref 1.1–1.8)
ALP ISO SERPL-ACNC: 60 U/L (ref 46–116)
ALT SERPLBLD-CCNC: 31 U/L (ref 12–78)
ANION GAP SERPL CALC-SCNC: 8.7 MEQ/L (ref 5–15)
AST SERPL QL: 26 U/L (ref 15–37)
BILIRUBIN,TOTAL: 0.8 MG/DL (ref 0.2–1)
BUN SERPL-MCNC: 23 MG/DL (ref 7–18)
CALCIUM SPEC-MCNC: 9.1 MG/DL (ref 8.5–10.1)
CHLORIDE SPEC-SCNC: 105 MMOL/L (ref 98–107)
CO2 SERPL-SCNC: 30 MMOL/L (ref 21–32)
CREAT BLD-SCNC: 1.04 MG/DL (ref 0.55–1.02)
CRP SERPL HS-MCNC: < 0.2 MG/L (ref 0–0.9)
ESTIMATED GLOMERULAR FILT RATE: 52 ML/MIN (ref 60–?)
GFR (AFRICAN AMERICAN): 62 ML/MIN (ref 60–?)
GLOBULIN SER CALC-MCNC: 3 GM/DL (ref 1.3–3.2)
GLUCOSE: 108 MG/DL (ref 74–106)
HCT VFR BLD CALC: 33.3 % (ref 37–47)
HGB BLD-MCNC: 10.5 G/DL (ref 12.2–16.2)
MCHC RBC-ENTMCNC: 31.5 G/DL (ref 31.8–35.4)
MCV RBC: 100.6 FL (ref 81–99)
MEAN CORPUSCULAR HEMOGLOBIN: 31.7 PG (ref 27–31.2)
PLATELET # BLD: 144 K/MM3 (ref 142–424)
POTASSIUM: 3.7 MMOL/L (ref 3.5–5.1)
PROT SERPL-MCNC: 6.6 GM/DL (ref 6.4–8.2)
RBC # BLD AUTO: 3.31 M/MM3 (ref 4.2–5.4)
SODIUM SPEC-SCNC: 140 MMOL/L (ref 136–145)
WBC # BLD AUTO: 4 K/MM3 (ref 4.8–10.8)

## 2019-06-13 PROCEDURE — 36415 COLL VENOUS BLD VENIPUNCTURE: CPT

## 2019-06-13 PROCEDURE — 80053 COMPREHEN METABOLIC PANEL: CPT

## 2019-06-13 PROCEDURE — 86140 C-REACTIVE PROTEIN: CPT

## 2019-06-13 PROCEDURE — 85025 COMPLETE CBC W/AUTO DIFF WBC: CPT

## 2019-06-13 PROCEDURE — 85651 RBC SED RATE NONAUTOMATED: CPT

## 2019-08-01 ENCOUNTER — HOSPITAL ENCOUNTER (OUTPATIENT)
Age: 79
End: 2019-08-01
Payer: COMMERCIAL

## 2019-08-01 DIAGNOSIS — M05.742: ICD-10-CM

## 2019-08-01 DIAGNOSIS — M05.741: Primary | ICD-10-CM

## 2019-08-01 DIAGNOSIS — Z51.81: ICD-10-CM

## 2019-08-01 LAB
ALBUMIN LEVEL: 3.5 GM/DL (ref 3.4–5)
ALBUMIN/GLOB SERPL: 1.1 {RATIO} (ref 1.1–1.8)
ALP ISO SERPL-ACNC: 60 U/L (ref 46–116)
ALT SERPLBLD-CCNC: 32 U/L (ref 12–78)
ANION GAP SERPL CALC-SCNC: 11.2 MEQ/L (ref 5–15)
AST SERPL QL: 26 U/L (ref 15–37)
BILIRUBIN,TOTAL: 0.7 MG/DL (ref 0.2–1)
BUN SERPL-MCNC: 25 MG/DL (ref 7–18)
CALCIUM SPEC-MCNC: 9.1 MG/DL (ref 8.5–10.1)
CHLORIDE SPEC-SCNC: 105 MMOL/L (ref 98–107)
CO2 SERPL-SCNC: 31 MMOL/L (ref 21–32)
CREAT BLD-SCNC: 0.99 MG/DL (ref 0.55–1.02)
CRP SERPL HS-MCNC: < 0.2 MG/DL (ref 0–0.9)
ESTIMATED GLOMERULAR FILT RATE: 55 ML/MIN (ref 60–?)
GFR (AFRICAN AMERICAN): 66 ML/MIN (ref 60–?)
GLOBULIN SER CALC-MCNC: 3.1 GM/DL (ref 1.3–3.2)
GLUCOSE: 123 MG/DL (ref 74–106)
HCT VFR BLD CALC: 36.4 % (ref 37–47)
HGB BLD-MCNC: 11.2 G/DL (ref 12.2–16.2)
MCHC RBC-ENTMCNC: 30.7 G/DL (ref 31.8–35.4)
MCV RBC: 105.7 FL (ref 81–99)
MEAN CORPUSCULAR HEMOGLOBIN: 32.4 PG (ref 27–31.2)
PLATELET # BLD: 137 K/MM3 (ref 142–424)
POTASSIUM: 4.2 MMOL/L (ref 3.5–5.1)
PROT SERPL-MCNC: 6.6 GM/DL (ref 6.4–8.2)
RBC # BLD AUTO: 3.45 M/MM3 (ref 4.2–5.4)
SODIUM SPEC-SCNC: 143 MMOL/L (ref 136–145)
WBC # BLD AUTO: 3.2 K/MM3 (ref 4.8–10.8)

## 2019-08-01 PROCEDURE — 86140 C-REACTIVE PROTEIN: CPT

## 2019-08-01 PROCEDURE — 85651 RBC SED RATE NONAUTOMATED: CPT

## 2019-08-01 PROCEDURE — 36415 COLL VENOUS BLD VENIPUNCTURE: CPT

## 2019-08-01 PROCEDURE — 85025 COMPLETE CBC W/AUTO DIFF WBC: CPT

## 2019-08-01 PROCEDURE — 80053 COMPREHEN METABOLIC PANEL: CPT

## 2019-10-07 ENCOUNTER — HOSPITAL ENCOUNTER (OUTPATIENT)
Age: 79
End: 2019-10-07
Payer: COMMERCIAL

## 2019-10-07 DIAGNOSIS — M05.742: ICD-10-CM

## 2019-10-07 DIAGNOSIS — R11.2: Primary | ICD-10-CM

## 2019-10-07 DIAGNOSIS — Z51.81: ICD-10-CM

## 2019-10-07 DIAGNOSIS — M05.741: ICD-10-CM

## 2019-10-07 LAB
ALBUMIN LEVEL: 3.3 GM/DL (ref 3.4–5)
ALBUMIN/GLOB SERPL: 0.9 {RATIO} (ref 1.1–1.8)
ALP ISO SERPL-ACNC: 57 U/L (ref 46–116)
ALT SERPLBLD-CCNC: 17 U/L (ref 12–78)
ANION GAP SERPL CALC-SCNC: 10.7 MEQ/L (ref 5–15)
AST SERPL QL: 23 U/L (ref 15–37)
BILIRUBIN,TOTAL: 0.5 MG/DL (ref 0.2–1)
BUN SERPL-MCNC: 25 MG/DL (ref 7–18)
CALCIUM SPEC-MCNC: 9.1 MG/DL (ref 8.5–10.1)
CELLS COUNTED: 100
CHLORIDE SPEC-SCNC: 104 MMOL/L (ref 98–107)
CO2 SERPL-SCNC: 31 MMOL/L (ref 21–32)
CREAT BLD-SCNC: 1.06 MG/DL (ref 0.55–1.02)
CRP SERPL HS-MCNC: 1.1 MG/DL (ref 0–0.9)
ESTIMATED GLOMERULAR FILT RATE: 50 ML/MIN (ref 60–?)
GFR (AFRICAN AMERICAN): 61 ML/MIN (ref 60–?)
GLOBULIN SER CALC-MCNC: 3.8 GM/DL (ref 1.3–3.2)
GLUCOSE: 119 MG/DL (ref 74–106)
HCT VFR BLD CALC: 40.8 % (ref 37–47)
HGB BLD-MCNC: 12.4 G/DL (ref 12.2–16.2)
LIPASE: 201 U/L (ref 73–393)
MANUAL DIFFERENTIAL: (no result)
MCHC RBC-ENTMCNC: 30.4 G/DL (ref 31.8–35.4)
MCV RBC: 105.1 FL (ref 81–99)
MEAN CORPUSCULAR HEMOGLOBIN: 31.9 PG (ref 27–31.2)
PLATELET # BLD: 158 K/MM3 (ref 142–424)
POTASSIUM: 3.7 MMOL/L (ref 3.5–5.1)
PROT SERPL-MCNC: 7.1 GM/DL (ref 6.4–8.2)
RBC # BLD AUTO: 3.88 M/MM3 (ref 4.2–5.4)
SODIUM SPEC-SCNC: 142 MMOL/L (ref 136–145)
WBC # BLD AUTO: 3.4 K/MM3 (ref 4.8–10.8)

## 2019-10-07 PROCEDURE — 85651 RBC SED RATE NONAUTOMATED: CPT

## 2019-10-07 PROCEDURE — 85007 BL SMEAR W/DIFF WBC COUNT: CPT

## 2019-10-07 PROCEDURE — 83690 ASSAY OF LIPASE: CPT

## 2019-10-07 PROCEDURE — 80053 COMPREHEN METABOLIC PANEL: CPT

## 2019-10-07 PROCEDURE — 36415 COLL VENOUS BLD VENIPUNCTURE: CPT

## 2019-10-07 PROCEDURE — 85025 COMPLETE CBC W/AUTO DIFF WBC: CPT

## 2019-10-07 PROCEDURE — 86140 C-REACTIVE PROTEIN: CPT

## 2019-11-05 ENCOUNTER — HOSPITAL ENCOUNTER (OUTPATIENT)
Age: 79
End: 2019-11-05
Payer: COMMERCIAL

## 2019-11-05 DIAGNOSIS — Z51.81: ICD-10-CM

## 2019-11-05 DIAGNOSIS — M05.741: Primary | ICD-10-CM

## 2019-11-05 DIAGNOSIS — M05.742: ICD-10-CM

## 2019-11-05 LAB
ALBUMIN LEVEL: 3.4 GM/DL (ref 3.4–5)
ALBUMIN/GLOB SERPL: 1 {RATIO} (ref 1.1–1.8)
ALP ISO SERPL-ACNC: 62 U/L (ref 46–116)
ALT SERPLBLD-CCNC: 18 U/L (ref 12–78)
ANION GAP SERPL CALC-SCNC: 13 MEQ/L (ref 5–15)
AST SERPL QL: 17 U/L (ref 15–37)
BILIRUBIN,TOTAL: 0.3 MG/DL (ref 0.2–1)
BUN SERPL-MCNC: 20 MG/DL (ref 7–18)
CALCIUM SPEC-MCNC: 8.9 MG/DL (ref 8.5–10.1)
CHLORIDE SPEC-SCNC: 105 MMOL/L (ref 98–107)
CO2 SERPL-SCNC: 26 MMOL/L (ref 21–32)
CREAT BLD-SCNC: 0.99 MG/DL (ref 0.55–1.02)
CRP SERPL HS-MCNC: < 0.2 MG/DL (ref 0–0.9)
ESTIMATED GLOMERULAR FILT RATE: 54 ML/MIN (ref 60–?)
GFR (AFRICAN AMERICAN): 66 ML/MIN (ref 60–?)
GLOBULIN SER CALC-MCNC: 3.5 GM/DL (ref 1.3–3.2)
GLUCOSE: 110 MG/DL (ref 74–106)
HCT VFR BLD CALC: 35.4 % (ref 37–47)
HGB BLD-MCNC: 11.4 G/DL (ref 12.2–16.2)
MCHC RBC-ENTMCNC: 32.2 G/DL (ref 31.8–35.4)
MCV RBC: 99.7 FL (ref 81–99)
MEAN CORPUSCULAR HEMOGLOBIN: 32.1 PG (ref 27–31.2)
PLATELET # BLD: 147 K/MM3 (ref 142–424)
POTASSIUM: 4 MMOL/L (ref 3.5–5.1)
PROT SERPL-MCNC: 6.9 GM/DL (ref 6.4–8.2)
RBC # BLD AUTO: 3.55 M/MM3 (ref 4.2–5.4)
SODIUM SPEC-SCNC: 140 MMOL/L (ref 136–145)
WBC # BLD AUTO: 3.7 K/MM3 (ref 4.8–10.8)

## 2019-11-05 PROCEDURE — 85025 COMPLETE CBC W/AUTO DIFF WBC: CPT

## 2019-11-05 PROCEDURE — 36415 COLL VENOUS BLD VENIPUNCTURE: CPT

## 2019-11-05 PROCEDURE — 80053 COMPREHEN METABOLIC PANEL: CPT

## 2019-11-05 PROCEDURE — 85651 RBC SED RATE NONAUTOMATED: CPT

## 2019-11-05 PROCEDURE — 86140 C-REACTIVE PROTEIN: CPT

## 2019-11-30 ENCOUNTER — HOSPITAL ENCOUNTER (OUTPATIENT)
Age: 79
End: 2019-11-30
Payer: COMMERCIAL

## 2019-11-30 DIAGNOSIS — Z51.81: ICD-10-CM

## 2019-11-30 DIAGNOSIS — M05.741: Primary | ICD-10-CM

## 2019-11-30 DIAGNOSIS — M05.742: ICD-10-CM

## 2019-11-30 LAB
ALBUMIN LEVEL: 3.5 GM/DL (ref 3.4–5)
ALBUMIN/GLOB SERPL: 1 {RATIO} (ref 1.1–1.8)
ALP ISO SERPL-ACNC: 68 U/L (ref 46–116)
ALT SERPLBLD-CCNC: 15 U/L (ref 12–78)
ANION GAP SERPL CALC-SCNC: 12.3 MEQ/L (ref 5–15)
AST SERPL QL: 22 U/L (ref 15–37)
BILIRUBIN,TOTAL: 0.5 MG/DL (ref 0.2–1)
BUN SERPL-MCNC: 15 MG/DL (ref 7–18)
CALCIUM SPEC-MCNC: 8.7 MG/DL (ref 8.5–10.1)
CHLORIDE SPEC-SCNC: 104 MMOL/L (ref 98–107)
CO2 SERPL-SCNC: 29 MMOL/L (ref 21–32)
CREAT BLD-SCNC: 1.12 MG/DL (ref 0.55–1.02)
CRP SERPL HS-MCNC: < 0.2 MG/DL (ref 0–0.9)
ESTIMATED GLOMERULAR FILT RATE: 47 ML/MIN (ref 60–?)
GFR (AFRICAN AMERICAN): 57 ML/MIN (ref 60–?)
GLOBULIN SER CALC-MCNC: 3.4 GM/DL (ref 1.3–3.2)
GLUCOSE: 103 MG/DL (ref 74–106)
HCT VFR BLD CALC: 36.7 % (ref 37–47)
HGB BLD-MCNC: 11.7 G/DL (ref 12.2–16.2)
MCHC RBC-ENTMCNC: 31.9 G/DL (ref 31.8–35.4)
MCV RBC: 100.2 FL (ref 81–99)
MEAN CORPUSCULAR HEMOGLOBIN: 32 PG (ref 27–31.2)
PLATELET # BLD: 132 K/MM3 (ref 142–424)
POTASSIUM: 4.3 MMOL/L (ref 3.5–5.1)
PROT SERPL-MCNC: 6.9 GM/DL (ref 6.4–8.2)
RBC # BLD AUTO: 3.66 M/MM3 (ref 4.2–5.4)
SODIUM SPEC-SCNC: 141 MMOL/L (ref 136–145)
WBC # BLD AUTO: 3.1 K/MM3 (ref 4.8–10.8)

## 2019-11-30 PROCEDURE — 86140 C-REACTIVE PROTEIN: CPT

## 2019-11-30 PROCEDURE — 85651 RBC SED RATE NONAUTOMATED: CPT

## 2019-11-30 PROCEDURE — 80053 COMPREHEN METABOLIC PANEL: CPT

## 2019-11-30 PROCEDURE — 36415 COLL VENOUS BLD VENIPUNCTURE: CPT

## 2019-11-30 PROCEDURE — 85025 COMPLETE CBC W/AUTO DIFF WBC: CPT

## 2019-12-27 ENCOUNTER — HOSPITAL ENCOUNTER (OUTPATIENT)
Age: 79
End: 2019-12-27
Payer: COMMERCIAL

## 2019-12-27 DIAGNOSIS — M05.741: ICD-10-CM

## 2019-12-27 DIAGNOSIS — M05.742: Primary | ICD-10-CM

## 2019-12-27 DIAGNOSIS — Z51.81: ICD-10-CM

## 2019-12-27 LAB
ALBUMIN LEVEL: 3.8 GM/DL (ref 3.4–5)
ALBUMIN/GLOB SERPL: 1.1 {RATIO} (ref 1.1–1.8)
ALP ISO SERPL-ACNC: 73 U/L (ref 46–116)
ALT SERPLBLD-CCNC: 21 U/L (ref 12–78)
ANION GAP SERPL CALC-SCNC: 14.3 MEQ/L (ref 5–15)
AST SERPL QL: 22 U/L (ref 15–37)
BILIRUBIN,TOTAL: 0.6 MG/DL (ref 0.2–1)
BUN SERPL-MCNC: 22 MG/DL (ref 7–18)
CALCIUM SPEC-MCNC: 8.7 MG/DL (ref 8.5–10.1)
CELLS COUNTED: 100
CHLORIDE SPEC-SCNC: 103 MMOL/L (ref 98–107)
CO2 SERPL-SCNC: 27 MMOL/L (ref 21–32)
CREAT BLD-SCNC: 1.06 MG/DL (ref 0.55–1.02)
CRP SERPL HS-MCNC: < 0.2 MG/DL (ref 0–0.9)
ESTIMATED GLOMERULAR FILT RATE: 50 ML/MIN (ref 60–?)
GFR (AFRICAN AMERICAN): 61 ML/MIN (ref 60–?)
GLOBULIN SER CALC-MCNC: 3.4 GM/DL (ref 1.3–3.2)
GLUCOSE: 101 MG/DL (ref 74–106)
HCT VFR BLD CALC: 38.8 % (ref 37–47)
HGB BLD-MCNC: 12.7 G/DL (ref 12.2–16.2)
MANUAL DIFFERENTIAL: (no result)
MCHC RBC-ENTMCNC: 32.8 G/DL (ref 31.8–35.4)
MCV RBC: 97.6 FL (ref 81–99)
MEAN CORPUSCULAR HEMOGLOBIN: 32 PG (ref 27–31.2)
PLATELET # BLD: 162 K/MM3 (ref 142–424)
POTASSIUM: 4.3 MMOL/L (ref 3.5–5.1)
PROT SERPL-MCNC: 7.2 GM/DL (ref 6.4–8.2)
RBC # BLD AUTO: 3.98 M/MM3 (ref 4.2–5.4)
SODIUM SPEC-SCNC: 140 MMOL/L (ref 136–145)
WBC # BLD AUTO: 3.3 K/MM3 (ref 4.8–10.8)

## 2019-12-27 PROCEDURE — 85007 BL SMEAR W/DIFF WBC COUNT: CPT

## 2019-12-27 PROCEDURE — 86140 C-REACTIVE PROTEIN: CPT

## 2019-12-27 PROCEDURE — 85025 COMPLETE CBC W/AUTO DIFF WBC: CPT

## 2019-12-27 PROCEDURE — 80053 COMPREHEN METABOLIC PANEL: CPT

## 2019-12-27 PROCEDURE — 36415 COLL VENOUS BLD VENIPUNCTURE: CPT

## 2019-12-27 PROCEDURE — 85651 RBC SED RATE NONAUTOMATED: CPT

## 2020-02-17 ENCOUNTER — HOSPITAL ENCOUNTER (OUTPATIENT)
Age: 80
End: 2020-02-17
Payer: COMMERCIAL

## 2020-02-17 DIAGNOSIS — M05.742: ICD-10-CM

## 2020-02-17 DIAGNOSIS — M05.741: Primary | ICD-10-CM

## 2020-02-17 DIAGNOSIS — Z51.81: ICD-10-CM

## 2020-02-17 LAB
ALBUMIN LEVEL: 3.6 G/DL (ref 3.4–5)
ALBUMIN/GLOB SERPL: 1.1 {RATIO} (ref 1.1–1.8)
ALP ISO SERPL-ACNC: 66 U/L (ref 46–116)
ALT SERPLBLD-CCNC: 21 U/L (ref 9–52)
ANION GAP SERPL CALC-SCNC: 10.7 MEQ/L (ref 5–15)
AST SERPL QL: 20 U/L (ref 15–37)
BILIRUBIN,TOTAL: 0.7 MG/DL (ref 0.2–1)
BUN SERPL-MCNC: 26 MG/DL (ref 7–18)
CALCIUM SPEC-MCNC: 8.9 MG/DL (ref 8.5–10.1)
CHLORIDE SPEC-SCNC: 110 MMOL/L (ref 98–107)
CO2 SERPL-SCNC: 30 MMOL/L (ref 21–32)
CREAT BLD-SCNC: 1.2 MG/DL (ref 0.55–1.02)
CRP SERPL HS-MCNC: < 0.2 MG/DL (ref 0–0.9)
ESTIMATED GLOMERULAR FILT RATE: 44 ML/MIN (ref 60–?)
GFR (AFRICAN AMERICAN): 53 ML/MIN (ref 60–?)
GLOBULIN SER CALC-MCNC: 3.3 GM/DL (ref 1.3–3.2)
GLUCOSE: 103 MG/DL (ref 74–106)
HCT VFR BLD CALC: 36.6 % (ref 37–47)
HGB BLD-MCNC: 11.8 G/DL (ref 12.2–16.2)
MCHC RBC-ENTMCNC: 32.2 G/DL (ref 31.8–35.4)
MCV RBC: 100.4 FL (ref 81–99)
MEAN CORPUSCULAR HEMOGLOBIN: 32.3 PG (ref 27–31.2)
PLATELET # BLD: 133 K/MM3 (ref 142–424)
POTASSIUM: 4.7 MMOL/L (ref 3.5–5.1)
PROT SERPL-MCNC: 6.9 G/DL (ref 6.4–8.2)
RBC # BLD AUTO: 3.65 M/MM3 (ref 4.2–5.4)
SODIUM SPEC-SCNC: 146 MMOL/L (ref 137–145)
WBC # BLD AUTO: 3.5 K/MM3 (ref 4.8–10.8)

## 2020-02-17 PROCEDURE — 85651 RBC SED RATE NONAUTOMATED: CPT

## 2020-02-17 PROCEDURE — 80053 COMPREHEN METABOLIC PANEL: CPT

## 2020-02-17 PROCEDURE — 86140 C-REACTIVE PROTEIN: CPT

## 2020-02-17 PROCEDURE — 85025 COMPLETE CBC W/AUTO DIFF WBC: CPT

## 2020-02-17 PROCEDURE — 36415 COLL VENOUS BLD VENIPUNCTURE: CPT

## 2022-08-16 NOTE — PC.NURSE
[facility]    NAME: Ryan Belcher  AGE: [id number]}  · Exercise: {annual physical; exercise:2102}  Depression Screening  PHQ-2/9 Depression Screening    Little interest or pleasure in doing things: 0 - not at all  Feeling down, depressed, or hopeless: 0 - not at all  PHQ-2 Score: 0  PHQ-2 Interpretation: Negative depression screen       General Health  · Sleep: {annual physical; sleep:2102}  · Hearing: {annual physical; hearin}  · Vision: {annual physical; vision:}  · Dental: {annual physical; dental:}  /GYN Health  · Patient is: {Menopause:84901}  · Last menstrual period: ***  · Contraceptive method: {contraceptive options:42937}       Review of Systems:     Review of Systems   Past Medical History:     Past Medical History:   Diagnosis Date     2008    14 weeks     Anemia in pregnancy     Arthritis     Back ache     Rape     Varicella       Past Surgical History:     Past Surgical History:   Procedure Laterality Date    MT  DELIVERY ONLY N/A Report given to CORY Suggs RN 3/15/2017    Procedure:  SECTION ();   Surgeon: Ml Ramirez MD;  Location: Chilton Carlos Ferrara,   [facility]

## 2023-05-15 ENCOUNTER — HOSPITAL ENCOUNTER (EMERGENCY)
Age: 83
Discharge: HOME | End: 2023-05-15
Payer: COMMERCIAL

## 2023-05-15 VITALS — DIASTOLIC BLOOD PRESSURE: 78 MMHG | OXYGEN SATURATION: 99 % | SYSTOLIC BLOOD PRESSURE: 181 MMHG | HEART RATE: 81 BPM

## 2023-05-15 VITALS
DIASTOLIC BLOOD PRESSURE: 95 MMHG | HEART RATE: 74 BPM | OXYGEN SATURATION: 99 % | RESPIRATION RATE: 16 BRPM | TEMPERATURE: 97.7 F | SYSTOLIC BLOOD PRESSURE: 190 MMHG

## 2023-05-15 VITALS — BODY MASS INDEX: 20.9 KG/M2

## 2023-05-15 VITALS
DIASTOLIC BLOOD PRESSURE: 93 MMHG | RESPIRATION RATE: 17 BRPM | HEART RATE: 99 BPM | SYSTOLIC BLOOD PRESSURE: 196 MMHG | TEMPERATURE: 98 F

## 2023-05-15 DIAGNOSIS — S60.222A: Primary | ICD-10-CM
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
## 2023-05-15 PROCEDURE — 99283 EMERGENCY DEPT VISIT LOW MDM: CPT

## 2023-05-15 PROCEDURE — 73130 X-RAY EXAM OF HAND: CPT
